# Patient Record
Sex: FEMALE | Race: WHITE | Employment: FULL TIME | URBAN - METROPOLITAN AREA
[De-identification: names, ages, dates, MRNs, and addresses within clinical notes are randomized per-mention and may not be internally consistent; named-entity substitution may affect disease eponyms.]

---

## 2017-05-25 ENCOUNTER — OFFICE VISIT (OUTPATIENT)
Dept: FAMILY MEDICINE | Facility: CLINIC | Age: 22
End: 2017-05-25
Payer: COMMERCIAL

## 2017-05-25 VITALS
OXYGEN SATURATION: 99 % | HEART RATE: 70 BPM | BODY MASS INDEX: 28.66 KG/M2 | DIASTOLIC BLOOD PRESSURE: 70 MMHG | SYSTOLIC BLOOD PRESSURE: 112 MMHG | WEIGHT: 146 LBS | TEMPERATURE: 98.9 F | HEIGHT: 60 IN

## 2017-05-25 DIAGNOSIS — N30.00 ACUTE CYSTITIS WITHOUT HEMATURIA: Primary | ICD-10-CM

## 2017-05-25 DIAGNOSIS — R35.0 URINARY FREQUENCY: ICD-10-CM

## 2017-05-25 LAB
ALBUMIN UR-MCNC: ABNORMAL MG/DL
APPEARANCE UR: CLEAR
BILIRUB UR QL STRIP: NEGATIVE
COLOR UR AUTO: YELLOW
GLUCOSE UR STRIP-MCNC: NEGATIVE MG/DL
HGB UR QL STRIP: NEGATIVE
KETONES UR STRIP-MCNC: NEGATIVE MG/DL
LEUKOCYTE ESTERASE UR QL STRIP: NEGATIVE
MUCOUS THREADS #/AREA URNS LPF: PRESENT /LPF
NITRATE UR QL: NEGATIVE
NON-SQ EPI CELLS #/AREA URNS LPF: ABNORMAL /LPF
PH UR STRIP: 7 PH (ref 5–7)
RBC #/AREA URNS AUTO: ABNORMAL /HPF (ref 0–2)
SP GR UR STRIP: 1.02 (ref 1–1.03)
URN SPEC COLLECT METH UR: ABNORMAL
UROBILINOGEN UR STRIP-ACNC: 1 EU/DL (ref 0.2–1)
WBC #/AREA URNS AUTO: ABNORMAL /HPF (ref 0–2)

## 2017-05-25 PROCEDURE — 81001 URINALYSIS AUTO W/SCOPE: CPT | Performed by: FAMILY MEDICINE

## 2017-05-25 PROCEDURE — 87086 URINE CULTURE/COLONY COUNT: CPT | Performed by: FAMILY MEDICINE

## 2017-05-25 PROCEDURE — 99213 OFFICE O/P EST LOW 20 MIN: CPT | Performed by: FAMILY MEDICINE

## 2017-05-25 RX ORDER — CIPROFLOXACIN 500 MG/1
500 TABLET, FILM COATED ORAL 2 TIMES DAILY
Qty: 10 TABLET | Refills: 0 | Status: SHIPPED | OUTPATIENT
Start: 2017-05-25 | End: 2018-04-11

## 2017-05-25 NOTE — MR AVS SNAPSHOT
"              After Visit Summary   2017    Tona Wilson    MRN: [id number]           Patient Information     Date Of Birth          1995        Visit Information        Provider Department      2017 5:40 PM Sukhdev Jameson MD [facility] [place]        Today's Diagnoses     Acute cystitis without hematuria    -  1    Urinary frequency           Follow-ups after your visit        Who to contact     If you have questions or need follow up information about today's clinic visit or your schedule please contact [facility] directly at [phone].  Normal or non-critical lab and imaging results will be communicated to you by [employer], letter or phone within 4 business days after the clinic has received the results. If you do not hear from us within 7 days, please contact the clinic through [employer] or phone. If you have a critical or abnormal lab result, we will notify you by phone as soon as possible.  Submit refill requests through [employer] or call your pharmacy and they will forward the refill request to us. Please allow 3 business days for your refill to be completed.          Additional Information About Your Visit        MyChart Information     [employer] lets you send messages to your doctor, view your test results, renew your prescriptions, schedule appointments and more. To sign up, go to [email or web address] . Click on \"Log in\" on the left side of the screen, which will take you to the Welcome page. Then click on \"Sign up Now\" on the right side of the page.     You will be asked to enter the access code listed below, as well as some personal information. Please follow the directions to create your username and password.     Your access code is: [email or web address]  Expires: 2017  5:53 PM     Your access code will  in 90 days. If you need help or a new code, please call your [facility] or [phone].        Care EveryWhere ID     This is your Care EveryWhere ID. " This could be used by other organizations to access your [place] medical records  [id number]        Your Vitals Were     Pulse Temperature Height Pulse Oximetry BMI (Body Mass Index)       70 98.9  F (37.2  C) (Tympanic) 5' (1.524 m) 99% 28.51 kg/m2        Blood Pressure from Last 3 Encounters:   05/25/17 112/70   04/26/16 110/78    Weight from Last 3 Encounters:   05/25/17 146 lb (66.2 kg)   04/26/16 138 lb 3.2 oz (62.7 kg) (68 %)*     * Growth percentiles are based on [facility][note 3] 2-20 Years data.              We Performed the Following     *UA reflex to Microscopic and Culture ([place] and [facility] (except [place] and [place])     Urine Culture Aerobic Bacterial     Urine Microscopic          Today's Medication Changes          These changes are accurate as of: 5/25/17  5:53 PM.  If you have any questions, ask your nurse or doctor.               Start taking these medicines.        Dose/Directions    ciprofloxacin 500 MG tablet   Commonly known as:  CIPRO   Used for:  Acute cystitis without hematuria   Started by:  Sukhdev Jameson MD        Dose:  500 mg   Take 1 tablet (500 mg) by mouth 2 times daily   Quantity:  10 tablet   Refills:  0            Where to get your medicines      These medications were sent to [id number]  [address]  [address] [facility]  [address] [place] [address]     Phone:  [phone]     ciprofloxacin 500 MG tablet                Primary Care Provider    None Specified       No primary provider on file.        Thank you!     Thank you for choosing [facility]  for your care. Our goal is always to provide you with excellent care. Hearing back from our patients is one way we can continue to improve our services. Please take a few minutes to complete the written survey that you may receive in the mail after your visit with us. Thank you!             Your Updated Medication List - Protect others around you: Learn how to  safely use, store and throw away your medicines at www.disposemymeds.org.          This list is accurate as of: 5/25/17  5:53 PM.  Always use your most recent med list.                   Brand Name Dispense Instructions for use    CALCIUM GUMMIES PO          ciprofloxacin 500 MG tablet    CIPRO    10 tablet    Take 1 tablet (500 mg) by mouth 2 times daily

## 2017-05-25 NOTE — NURSING NOTE
Chief Complaint   Patient presents with     UTI       Initial /70 (BP Location: Right arm, Patient Position: Chair, Cuff Size: Adult Regular)  Pulse 70  Temp 98.9  F (37.2  C) (Tympanic)  Ht 5' (1.524 m)  Wt 146 lb (66.2 kg)  SpO2 99%  BMI 28.51 kg/m2 Estimated body mass index is 28.51 kg/(m^2) as calculated from the following:    Height as of this encounter: 5' (1.524 m).    Weight as of this encounter: 146 lb (66.2 kg).  Medication Reconciliation: complete     Diana Lopez CMA

## 2017-05-25 NOTE — PROGRESS NOTES
SUBJECTIVE:                                                    Tona Wilson is a 21 year old female who presents to clinic today for the following health issues:      URINARY TRACT SYMPTOMS     Onset: x 3 days    Description:   Painful urination (Dysuria): no   Blood in urine (Hematuria): no   Delay in urine (Hesitency): no     Intensity: mild    Progression of Symptoms:  worsening    Accompanying Signs & Symptoms:  Fever/chills: no   Flank pain no   Nausea and vomiting: YES  Any vaginal symptoms: none  Abdominal/Pelvic Pain: YES   History:   History of frequent UTI's: no   History of kidney stones: no   Sexually Active: YES  Possibility of pregnancy: No    Precipitating factors:            Therapies Tried and outcome: Pyridium     Problem list and histories reviewed & adjusted, as indicated.  Additional history: as documented    Patient Active Problem List   Diagnosis     Breast lump in female     Easy bruising     Past Surgical History:   Procedure Laterality Date     dental surgrey         Social History   Substance Use Topics     Smoking status: Never Smoker     Smokeless tobacco: Not on file     Alcohol use No     Family History   Problem Relation Age of Onset     DIABETES No family hx of      Coronary Artery Disease No family hx of      Breast Cancer No family hx of          Current Outpatient Prescriptions   Medication Sig Dispense Refill     Calcium-Phosphorus-Vitamin D (CALCIUM GUMMIES PO)        ciprofloxacin (CIPRO) 500 MG tablet Take 1 tablet (500 mg) by mouth 2 times daily 10 tablet 0     No Known Allergies  No lab results found.   BP Readings from Last 3 Encounters:   05/25/17 112/70   04/26/16 110/78    Wt Readings from Last 3 Encounters:   05/25/17 146 lb (66.2 kg)   04/26/16 138 lb 3.2 oz (62.7 kg) (68 %)*     * Growth percentiles are based on CDC 2-20 Years data.                    Reviewed and updated as needed this visit by clinical staff       Reviewed and updated as needed this visit by  Provider         ROS:  Constitutional, HEENT, cardiovascular, pulmonary, gi and gu systems are negative, except as otherwise noted.    OBJECTIVE:                                                    /70 (BP Location: Right arm, Patient Position: Chair, Cuff Size: Adult Regular)  Pulse 70  Temp 98.9  F (37.2  C) (Tympanic)  Ht 5' (1.524 m)  Wt 146 lb (66.2 kg)  SpO2 99%  BMI 28.51 kg/m2  Body mass index is 28.51 kg/(m^2).  GENERAL: healthy, alert and no distress  NECK: no adenopathy, no asymmetry, masses, or scars and thyroid normal to palpation  RESP: lungs clear to auscultation - no rales, rhonchi or wheezes  CV: regular rate and rhythm, normal S1 S2, no S3 or S4, no murmur, click or rub, no peripheral edema and peripheral pulses strong  ABDOMEN: soft, nontender, no hepatosplenomegaly, no masses and bowel sounds normal  MS: no gross musculoskeletal defects noted, no edema         ASSESSMENT/PLAN:                                                    Tona was seen today for uti.    Diagnoses and all orders for this visit:    Acute cystitis without hematuria  -     ciprofloxacin (CIPRO) 500 MG tablet; Take 1 tablet (500 mg) by mouth 2 times daily  -     Urine Culture Aerobic Bacterial    Urinary frequency  -     *UA reflex to Microscopic and Culture ([place] and [facility] (except [place] and [place])  -     Urine Microscopic        Sukhdev Jameson MD  [facility]

## 2017-05-26 LAB
BACTERIA SPEC CULT: NO GROWTH
MICRO REPORT STATUS: NORMAL
SPECIMEN SOURCE: NORMAL

## 2017-08-10 ENCOUNTER — TELEPHONE (OUTPATIENT)
Dept: FAMILY MEDICINE | Facility: CLINIC | Age: 22
End: 2017-08-10

## 2017-08-10 NOTE — TELEPHONE ENCOUNTER
"Patient calling and states she has a bad migraine.  She states she became nauseated and when she stood up to go to the bathroom she was very unsteady and \"felt like she was going to fall over\".  Reports feeling like the room is spinning and having \"kind of blurred vision that is hazy and comes back into focus\"  She reports having weakness.      Denies: numbness or tingling on one side of the body, stiff neck, fever, changes in speech, confusion    Advised to seek ER care for some concerning symptoms.  Patient agreed with plan.  Laura Everett RN - Triage  [facility]    "

## 2018-04-11 ENCOUNTER — HOSPITAL ENCOUNTER (EMERGENCY)
Facility: CLINIC | Age: 23
Discharge: HOME | End: 2018-04-11
Attending: EMERGENCY MEDICINE | Admitting: EMERGENCY MEDICINE
Payer: COMMERCIAL

## 2018-04-11 ENCOUNTER — OFFICE VISIT (OUTPATIENT)
Dept: FAMILY MEDICINE | Facility: CLINIC | Age: 23
End: 2018-04-11
Payer: COMMERCIAL

## 2018-04-11 ENCOUNTER — APPOINTMENT (OUTPATIENT)
Dept: GENERAL RADIOLOGY | Facility: CLINIC | Age: 23
End: 2018-04-11
Attending: EMERGENCY MEDICINE
Payer: COMMERCIAL

## 2018-04-11 ENCOUNTER — RADIANT APPOINTMENT (OUTPATIENT)
Dept: GENERAL RADIOLOGY | Facility: CLINIC | Age: 23
End: 2018-04-11
Attending: FAMILY MEDICINE
Payer: COMMERCIAL

## 2018-04-11 VITALS
SYSTOLIC BLOOD PRESSURE: 104 MMHG | WEIGHT: 136 LBS | TEMPERATURE: 97.6 F | BODY MASS INDEX: 26.56 KG/M2 | DIASTOLIC BLOOD PRESSURE: 70 MMHG | HEART RATE: 80 BPM

## 2018-04-11 VITALS
RESPIRATION RATE: 12 BRPM | BODY MASS INDEX: 26.7 KG/M2 | SYSTOLIC BLOOD PRESSURE: 125 MMHG | TEMPERATURE: 98.6 F | WEIGHT: 136 LBS | HEIGHT: 60 IN | OXYGEN SATURATION: 100 % | HEART RATE: 96 BPM | DIASTOLIC BLOOD PRESSURE: 79 MMHG

## 2018-04-11 DIAGNOSIS — S69.90XA THUMB INJURY, INITIAL ENCOUNTER: ICD-10-CM

## 2018-04-11 DIAGNOSIS — S62.523B OPEN FRACTURE OF TUFT OF DISTAL PHALANX OF THUMB: ICD-10-CM

## 2018-04-11 DIAGNOSIS — S60.10XA SUBUNGUAL HEMATOMA OF FINGER, INITIAL ENCOUNTER: ICD-10-CM

## 2018-04-11 DIAGNOSIS — S69.90XA THUMB INJURY, INITIAL ENCOUNTER: Primary | ICD-10-CM

## 2018-04-11 PROCEDURE — 99284 EMERGENCY DEPT VISIT MOD MDM: CPT | Mod: 25

## 2018-04-11 PROCEDURE — 99283 EMERGENCY DEPT VISIT LOW MDM: CPT | Mod: 25

## 2018-04-11 PROCEDURE — 11740 EVACUATION SUBUNGUAL HMTMA: CPT

## 2018-04-11 PROCEDURE — 26750 TREAT FINGER FRACTURE EACH: CPT | Mod: LT

## 2018-04-11 PROCEDURE — 73140 X-RAY EXAM OF FINGER(S): CPT | Mod: LT

## 2018-04-11 PROCEDURE — 99214 OFFICE O/P EST MOD 30 MIN: CPT | Performed by: FAMILY MEDICINE

## 2018-04-11 RX ORDER — TRAMADOL HYDROCHLORIDE 50 MG/1
50 TABLET ORAL EVERY 6 HOURS PRN
Qty: 10 TABLET | Refills: 0 | Status: SHIPPED | OUTPATIENT
Start: 2018-04-11

## 2018-04-11 ASSESSMENT — ENCOUNTER SYMPTOMS: ARTHRALGIAS: 1

## 2018-04-11 NOTE — PROGRESS NOTES
SUBJECTIVE:   Tona Wilson is a 22 year old female who presents to clinic today for the following health issues:      Concern - left thumb discomfort  Onset: last evening    Description:   Slammed thumb in car door    Intensity: moderate    Progression of Symptoms:  worsening    Accompanying Signs & Symptoms:  Discoloration and swelling     Therapies Tried and outcome: ICE    Patient came today after she has accidentally bumped left thumb area in the car door. she is having discomfort and swelling and discoloration of the nail since then.  She feels a pulsating pain.  Normal range of motion.    Reviewed and updated as needed this visit by clinical staff  Tobacco  Allergies  Meds  Soc Hx      Reviewed and updated as needed this visit by Provider         ROS:  CONSTITUTIONAL: NEGATIVE for fever, chills, change in weight  ROS otherwise negative    OBJECTIVE:                                                    /70  Pulse 80  Temp 97.6  F (36.4  C) (Tympanic)  Wt 136 lb (61.7 kg)  LMP 03/27/2018 (Exact Date)  BMI 26.56 kg/m2  Body mass index is 26.56 kg/(m^2).   GENERAL: healthy, alert, well nourished, well hydrated, no distress  Thumb has sub uncal hematoma.         ASSESSMENT/PLAN:                                                        ICD-10-CM    1. Thumb injury, initial encounter S69.90XA XR Finger Left G/E 2 Views     traMADol (ULTRAM) 50 MG tablet     ORTHOPEDICS ADULT REFERRAL   2. Subungual hematoma of finger, initial encounter S60.10XA        Patient has thumb injury to the distal phalanx.  X-ray reviewed possible small nondisplaced fracture to that area.  Patient also have subungual hematoma.  We discussed about treatment including small hole to drain it.  Trauma happened almost 12-24 hours ago.  I suggested soaking it in the water I gave her pain medication.  If her pain is not well controlled she is advised to go to the urgent care or ER setting to have them look at it and possible drainage of  that.  I also given a referral for orthopedics for further evaluation of distal phalanx possible fracture.    Benny Rodríguez MD  [facility]

## 2018-04-11 NOTE — NURSING NOTE
Chief Complaint   Patient presents with     Thumb Discomfort     left        Initial /70  Pulse 80  Temp 97.6  F (36.4  C) (Tympanic)  Wt 136 lb (61.7 kg)  LMP 03/27/2018 (Exact Date)  BMI 26.56 kg/m2 Estimated body mass index is 26.56 kg/(m^2) as calculated from the following:    Height as of 5/25/17: 5' (1.524 m).    Weight as of this encounter: 136 lb (61.7 kg).  Medication Reconciliation: complete    Current Outpatient Prescriptions   Medication Sig Dispense Refill     Calcium-Phosphorus-Vitamin D (CALCIUM GUMMIES PO)        ciprofloxacin (CIPRO) 500 MG tablet Take 1 tablet (500 mg) by mouth 2 times daily (Patient not taking: Reported on 4/11/2018) 10 tablet 0       Saul M, [facility]

## 2018-04-11 NOTE — DISCHARGE INSTRUCTIONS
Finger Fracture, Closed  You have a broken finger (fracture). This causes local pain, swelling, and bruising. This injury usually takes about 4 to 6 weeks to heal, but can take longer in some cases. Finger injuries are often treated with a splint or cast, or by taping the injured finger to the next one (sanket taping). This protects the injured finger and holds the bone in position while it heals. More serious fractures may need surgery.     If the fingernail has been severely injured, it will probably fall off in 1 to 2 weeks. A new fingernail will usually start to grow back within a month.  Home care  Follow these guidelines when caring for yourself at home:    Keep your hand elevated to reduce pain and swelling. When sitting or lying down keep your arm above the level of your heart. You can do this by placing your arm on a pillow that rests on your chest or on a pillow at your side. This is most important during the first 2 days (48 hours) after the injury.    Put an ice pack on the injured area. Do this for 20 minutes every 1 to 2 hours the first day for pain relief. You can make an ice pack by wrapping a plastic bag of ice cubes in a thin towel. As the ice melts, be careful that the cast or splint doesn t get wet. Continue using the ice pack 3 to 4 times a day until the pain and swelling go away.    Keep the cast or splint completely dry at all times. Bathe with your cast or splint out of the water. Protect it with a large plastic bag, rubber-banded at the top end. If a fiberglass cast or splint gets wet, you can dry it with a hair dryer.    If sanket tape was put on and it becomes wet or dirty, change it. You may replace it with paper, plastic, or cloth tape. Cloth tape and paper tapes must be kept dry. Keep the sanket tape in place for at least 4 weeks.    You may use acetaminophen or ibuprofen to control pain, unless another pain medicine was prescribed. If you have chronic liver or kidney disease, talk with  your healthcare provider before using these medicines. Also talk with your provider if you ve had a stomach ulcer or gastrointestinal bleeding.    Don t put creams or objects under the cast if you have itching.  Follow-up care  Follow up with your healthcare provider, or as advised. This is to make sure the bone is healing the way it should.  X-rays may be taken. You will be told of any new findings that may affect your care.  When to seek medical advice  Call your healthcare provider right away if any of these occur:    The plaster cast or splint becomes wet or soft    The cast or splint cracks    The fiberglass cast or splint stays wet for more than 24 hours    Pain or swelling gets worse    Redness, warmth, swelling, drainage from the wound, or foul odor from a cast or splint    Finger becomes more cold, blue, numb, or tingly    You can t move your finger    The skin around the cast or splint becomes red    Fever of 100.4 F (38 C) or higher, or as directed by your healthcare provider  Date Last Reviewed: 2/1/2017 2000-2017 [employer]. [address] [place] [address]. All rights reserved. This information is not intended as a substitute for professional medical care. Always follow your healthcare professional's instructions.        Subungual Hematoma    You just slammed the car door on your finger. The pain is nearly unbearable, and your nail has turned black and blue. It's likely you have a subungual hematoma. This is a pool of blood that collects under a nail after an injury. Although a nail hematoma is seldom serious, it can be very painful.  When to go to the emergency room (ER)  Any severe blow to a finger or toe should be checked by your health care provider. You may have broken bones or damage to other tissues. If you can't see your health care provider right away, go to the nearest emergency department.  What to expect in the ER    Your nail will be examined.    X-rays may be  taken to check for a bone fracture or other injury.    To drain the blood from the hematoma and relieve pain, the health care provider may make a small hole in the nail using a special mauricio or drill. The blood under the nail can drain out through this hole. The nail is then bandaged.    If the nail is badly damaged it may need to be removed. Deep cuts beneath the nail can then be repaired with stitches.  Follow-up  If the damaged nail isn't removed, it will most likely fall off on its own. A fingernail can regrow in as little as 8 weeks. Toenails take longer -- about 6 months. See your health care provider if you have any problems with the nail as it heals and regrows.  Date Last Reviewed: 5/5/2015 2000-2017 [employer]. [address] [place] [address], [place] [address]. All rights reserved. This information is not intended as a substitute for professional medical care. Always follow your healthcare professional's instructions.

## 2018-04-11 NOTE — MR AVS SNAPSHOT
After Visit Summary   4/11/2018    Tona Wilson    MRN: [id number]           Patient Information     Date Of Birth          1995        Visit Information        Provider Department      4/11/2018 1:00 PM Benny Rodríguez MD [facility] [place]        Today's Diagnoses     Thumb injury, initial encounter    -  1    Subungual hematoma of finger, initial encounter           Follow-ups after your visit        Additional Services     ORTHOPEDICS ADULT REFERRAL       Your provider has referred you to: [facility] [place] [phone]   [email or web address]    Please be aware that coverage of these services is subject to the terms and limitations of your health insurance plan.  Call member services at your health plan with any benefit or coverage questions.      Please bring the following to your appointment:    >>   Any x-rays, CTs or MRIs which have been performed.  Contact the facility where they were done to arrange for  prior to your scheduled appointment.  Any new CT, MRI or other procedures ordered by your specialist must be performed at a [place] facility or coordinated by your clinic's referral office.    >>   List of current medications   >>   This referral request   >>   Any documents/labs given to you for this referral                  Who to contact     If you have questions or need follow up information about today's clinic visit or your schedule please contact [facility] [place] directly at [phone].  Normal or non-critical lab and imaging results will be communicated to you by MyChart, letter or phone within 4 business days after the clinic has received the results. If you do not hear from us within 7 days, please contact the clinic through MyChart or phone. If you have a critical or abnormal lab result, we will notify you by phone as soon as possible.  Submit refill requests through [employer] or call your pharmacy and they will  "forward the refill request to us. Please allow 3 business days for your refill to be completed.          Additional Information About Your Visit        [employer] Information     [employer] lets you send messages to your doctor, view your test results, renew your prescriptions, schedule appointments and more. To sign up, go to [email or web address] . Click on \"Log in\" on the left side of the screen, which will take you to the Welcome page. Then click on \"Sign up Now\" on the right side of the page.     You will be asked to enter the access code listed below, as well as some personal information. Please follow the directions to create your username and password.     Your access code is: [email or web address]  Expires: 7/10/2018  1:36 PM     Your access code will  in 90 days. If you need help or a new code, please call your [place] clinic or [phone].        Care EveryWhere ID     This is your Care EveryWhere ID. This could be used by other organizations to access your [place] medical records  [id number]        Your Vitals Were     Pulse Temperature Last Period BMI (Body Mass Index)          80 97.6  F (36.4  C) (Tympanic) 2018 (Exact Date) 26.56 kg/m2         Blood Pressure from Last 3 Encounters:   18 104/70   17 112/70   16 110/78    Weight from Last 3 Encounters:   18 136 lb (61.7 kg)   17 146 lb (66.2 kg)   16 138 lb 3.2 oz (62.7 kg) (68 %)*     * Growth percentiles are based on CDC 2-20 Years data.              We Performed the Following     ORTHOPEDICS ADULT REFERRAL          Today's Medication Changes          These changes are accurate as of 18  1:36 PM.  If you have any questions, ask your nurse or doctor.               Start taking these medicines.        Dose/Directions    traMADol 50 MG tablet   Commonly known as:  ULTRAM   Used for:  Thumb injury, initial encounter   Started by:  Benny Rodríguez MD        Dose:  50 mg   Take 1 tablet (50 mg) by mouth every 6 " hours as needed for severe pain   Quantity:  10 tablet   Refills:  0         Stop taking these medicines if you haven't already. Please contact your care team if you have questions.     ciprofloxacin 500 MG tablet   Commonly known as:  CIPRO   Stopped by:  Benny Rodríguez MD                Where to get your medicines      Some of these will need a paper prescription and others can be bought over the counter.  Ask your nurse if you have questions.     Bring a paper prescription for each of these medications     traMADol 50 MG tablet                Primary Care Provider    [facility] MD Melanie       No address on file        Equal Access to Services     [facility]: Hadii aad ku hadasho Soomaali, waaxda luqadaha, qaybta kaalmada adeegyaant, leno buckner . So [facility] [phone].    ATENCIÓN: Si habla español, tiene a laguerre disposición servicios gratuitos de asistencia lingüística. Llame al [phone].    We comply with applicable federal civil rights laws and [place] laws. We do not discriminate on the basis of race, color, national origin, age, disability, sex, sexual orientation, or gender identity.            Thank you!     [facility]. Our goal is always to provide you with excellent care. Hearing back from our patients is one way we can continue to improve our services. Please take a few minutes to complete the written survey that you may receive in the mail after your visit with us. Thank you!             Your Updated Medication List - Protect others around you: Learn how to safely use, store and throw away your medicines at www.disposemymeds.org.          This list is accurate as of 4/11/18  1:36 PM.  Always use your most recent med list.                   Brand Name Dispense Instructions for use Diagnosis    CALCIUM GUMMIES PO           traMADol 50 MG tablet    ULTRAM    10 tablet    Take 1 tablet (50 mg) by mouth every 6  hours as needed for severe pain    Thumb injury, initial encounter

## 2018-04-11 NOTE — ED AVS SNAPSHOT
Emergency Department    [address] [place] [address]    Phone:  [phone]    Fax:  [phone]                                       Tona Wilson   MRN: [id number]    Department:   Emergency Department   Date of Visit:  4/11/2018           After Visit Summary Signature Page     I have received my discharge instructions, and my questions have been answered. I have discussed any challenges I see with this plan with the nurse or doctor.    ..........................................................................................................................................  Patient/Patient Representative Signature      ..........................................................................................................................................  Patient Representative Print Name and Relationship to Patient    ..................................................               ................................................  Date                                            Time    ..........................................................................................................................................  Reviewed by Signature/Title    ...................................................              ..............................................  Date                                                            Time

## 2018-04-11 NOTE — ED PROVIDER NOTES
"  History     Chief Complaint:  Hand Pain    HPI   Tona Wilson is an otherwise healthy 22 year old female who presents to the ED for evaluation of hand pain. The patient reports that she slammed her left thumb in her car door earlier today. She presented to her clinic, but was referred here as they \"did not have the proper tools there.\" Her last tetanus was in 2016. She denies any other injuries or complaints here today.    Allergies:  NKDA    Medications:    Tramadol    Past Medical History:    The patient denies any significant past medical history.    Past Surgical History:    Dental Surgery    Family History:    No past pertinent family history.    Social History:  Marital Status:  Single [1]  Negative for tobacco use.  Negative for alcohol use.    Review of Systems   Musculoskeletal: Positive for arthralgias.   All other systems reviewed and are negative.    Physical Exam     Patient Vitals for the past 24 hrs:   BP Temp Temp src Pulse Heart Rate Resp SpO2 Height Weight   04/11/18 1401 125/79 98.6  F (37  C) Oral 96 96 12 100 % 1.524 m (5') 61.7 kg (136 lb)     Physical Exam  General: Appears well-developed and well-nourished.   Head: No signs of trauma.   CV: Normal rate and regular rhythm.    Resp: Effort normal. No respiratory distress.   MSK: +tenderness to distal aspect of left thumb with associated swelling and ecchymosis under the nail.  No nailbed injury, no lacerations.  Neuro: The patient is alert and oriented.  Strength in upper extremities normal and symmetrical.   Sensation normal. Speech normal.  GCS 15  Skin: Skin is warm and dry. No rash noted.   Psych: normal mood and affect. behavior is normal.       Emergency Department Course     Imaging:  Radiographic findings were communicated with the patient who voiced understanding of the findings.  XR Fingers 2-3 views, left:   Mildly displaced fracture of the distal tuft of the first distal phalanx, as per radiology.     Procedures:  Procedure: Nail " Trephination    Location: Left Thumb    Indication: Subungual hematoma    Consent: verbal consent as obtained by the patient prior to the procedure.     Technique: using an 18g needle I placed a hole in the mid-proximal nail bed. There was a release of blood. There was no complications. The patient immediatly felt better following this intervention.     Emergency Department Course:  Nursing notes and vitals reviewed. (1410) I performed an exam of the patient as documented above.     The patient was sent for a Fingers XR while in the emergency department, findings above.     (1511) I rechecked the patient and discussed the results of her workup thus far. I performed a nail trephination, as noted above.    Findings and plan explained to the Patient. Patient discharged home with instructions regarding supportive care, medications, and reasons to return. The importance of close follow-up was reviewed.    I personally reviewed the imaging results with the Patient and answered all related questions prior to discharge.    Impression & Plan      Medical Decision Making:  Tona Wilson is a 22 year old female who presents due to thumb pain.  She actually closed her thumb in a car door shortly prior to arrival.  On my evaluation, she did have some swelling over the left distal thumb along with a subungual hematoma.  X-ray obtained that did show a tuft fracture but no other injuries.  I did do a trephination of the nail as needed which the patient tolerated quite well.  She was recommended to use warm compresses to that area along with ice to the pad of the finger.  She was given a splint and I recommended follow-up with orthopedic hand specialty for further evaluation to ensure that she is healing well.    Diagnosis:    ICD-10-CM   1. Open fracture of tuft of distal phalanx of thumb S62.523B   2. Subungual hematoma of finger, initial encounter S60.10XA     Disposition:  discharged to home    Scribe Disclosure:  Amy GAGNON  Jose, am serving as a scribe on 4/11/2018 at 2:19 PM to personally document services performed by Sheng Rae MD based on my observations and the provider's statements to me.     Amy Garcia  4/11/2018    EMERGENCY DEPARTMENT       Sheng Rae MD  04/13/18 174

## 2018-04-11 NOTE — ED AVS SNAPSHOT
Emergency Department    [address] [facility] [address]    Phone:  [phone]    Fax:  [phone]                                       Tona Wilson   MRN: [id number]    Department:   Emergency Department   Date of Visit:  4/11/2018           Patient Information     Date Of Birth          1995        Your diagnoses for this visit were:     Open fracture of tuft of distal phalanx of thumb     Subungual hematoma of finger, initial encounter        You were seen by Sheng Rae MD.      Follow-up Information     Call Bobby Cardoza MD.    Specialty:  Orthopedics    Contact information:    [facility]  [address] [address]  [phone]          Follow up with  Emergency Department.    Specialty:  EMERGENCY MEDICINE    Why:  As needed, If symptoms worsen    Contact information:    [address] [facility] [id number] [phone]        Discharge Instructions         Finger Fracture, Closed  You have a broken finger (fracture). This causes local pain, swelling, and bruising. This injury usually takes about 4 to 6 weeks to heal, but can take longer in some cases. Finger injuries are often treated with a splint or cast, or by taping the injured finger to the next one (buddy taping). This protects the injured finger and holds the bone in position while it heals. More serious fractures may need surgery.     If the fingernail has been severely injured, it will probably fall off in 1 to 2 weeks. A new fingernail will usually start to grow back within a month.  Home care  Follow these guidelines when caring for yourself at home:    Keep your hand elevated to reduce pain and swelling. When sitting or lying down keep your arm above the level of your heart. You can do this by placing your arm on a pillow that rests on your chest or on a pillow at your side. This is most important during the first 2 days (48 hours) after the injury.    Put an ice  pack on the injured area. Do this for 20 minutes every 1 to 2 hours the first day for pain relief. You can make an ice pack by wrapping a plastic bag of ice cubes in a thin towel. As the ice melts, be careful that the cast or splint doesn t get wet. Continue using the ice pack 3 to 4 times a day until the pain and swelling go away.    Keep the cast or splint completely dry at all times. Bathe with your cast or splint out of the water. Protect it with a large plastic bag, rubber-banded at the top end. If a fiberglass cast or splint gets wet, you can dry it with a hair dryer.    If sanket tape was put on and it becomes wet or dirty, change it. You may replace it with paper, plastic, or cloth tape. Cloth tape and paper tapes must be kept dry. Keep the sanket tape in place for at least 4 weeks.    You may use acetaminophen or ibuprofen to control pain, unless another pain medicine was prescribed. If you have chronic liver or kidney disease, talk with your healthcare provider before using these medicines. Also talk with your provider if you ve had a stomach ulcer or gastrointestinal bleeding.    Don t put creams or objects under the cast if you have itching.  Follow-up care  Follow up with your healthcare provider, or as advised. This is to make sure the bone is healing the way it should.  X-rays may be taken. You will be told of any new findings that may affect your care.  When to seek medical advice  Call your healthcare provider right away if any of these occur:    The plaster cast or splint becomes wet or soft    The cast or splint cracks    The fiberglass cast or splint stays wet for more than 24 hours    Pain or swelling gets worse    Redness, warmth, swelling, drainage from the wound, or foul odor from a cast or splint    Finger becomes more cold, blue, numb, or tingly    You can t move your finger    The skin around the cast or splint becomes red    Fever of 100.4 F (38 C) or higher, or as directed by your  healthcare provider  Date Last Reviewed: 2/1/2017 2000-2017 [employer]. [address] [place] [address]. All rights reserved. This information is not intended as a substitute for professional medical care. Always follow your healthcare professional's instructions.        Subungual Hematoma    You just slammed the car door on your finger. The pain is nearly unbearable, and your nail has turned black and blue. It's likely you have a subungual hematoma. This is a pool of blood that collects under a nail after an injury. Although a nail hematoma is seldom serious, it can be very painful.  When to go to the emergency room (ER)  Any severe blow to a finger or toe should be checked by your health care provider. You may have broken bones or damage to other tissues. If you can't see your health care provider right away, go to the nearest emergency department.  What to expect in the ER    Your nail will be examined.    X-rays may be taken to check for a bone fracture or other injury.    To drain the blood from the hematoma and relieve pain, the health care provider may make a small hole in the nail using a special mauricio or drill. The blood under the nail can drain out through this hole. The nail is then bandaged.    If the nail is badly damaged it may need to be removed. Deep cuts beneath the nail can then be repaired with stitches.  Follow-up  If the damaged nail isn't removed, it will most likely fall off on its own. A fingernail can regrow in as little as 8 weeks. Toenails take longer -- about 6 months. See your health care provider if you have any problems with the nail as it heals and regrows.  Date Last Reviewed: 5/5/2015 2000-2017 [employer]. [address] [place] [address]. All rights reserved. This information is not intended as a substitute for professional medical care. Always follow your healthcare professional's instructions.          24 Hour Appointment  Hotline       To make an appointment at any [facility], call [phone] ([phone]). If you don't have a family doctor or clinic, we will help you find one. [facility] are conveniently located to serve the needs of you and your family.             Review of your medicines      Our records show that you are taking the medicines listed below. If these are incorrect, please call your family doctor or clinic.        Dose / Directions Last dose taken    CALCIUM GUMMIES PO        Refills:  0        traMADol 50 MG tablet   Commonly known as:  ULTRAM   Dose:  50 mg   Quantity:  10 tablet        Take 1 tablet (50 mg) by mouth every 6 hours as needed for severe pain   Refills:  0                Procedures and tests performed during your visit     Fingers XR, 2-3 views, left      Orders Needing Specimen Collection     None      Pending Results     Date and Time Order Name Status Description    4/11/2018 1322 XR FINGER LT G/E 2 VW In process             Pending Culture Results     No orders found from 4/9/2018 to 4/12/2018.            Pending Results Instructions     If you had any lab results that were not finalized at the time of your Discharge, you can call the ED Lab Result RN at [phone]. You will be contacted by this team for any positive Lab results or changes in treatment. The nurses are available 7 days a week from 10A to 6:30P.  You can leave a message 24 hours per day and they will return your call.        Test Results From Your Hospital Stay        4/11/2018  2:48 PM      Narrative     XR FINGER LT G/E 2 VW 4/11/2018 2:35 PM    HISTORY: Pain.    COMPARISON: None.        Impression     IMPRESSION: Mildly displaced fracture of the distal tuft of the first  distal phalanx.    MACHO RITTER MD                Clinical Quality Measure: Blood Pressure Screening     Your blood pressure was checked while you were in the emergency department today. The last reading we obtained was  BP: 125/79 . Please  "read the guidelines below about what these numbers mean and what you should do about them.  If your systolic blood pressure (the top number) is less than 120 and your diastolic blood pressure (the bottom number) is less than 80, then your blood pressure is normal. There is nothing more that you need to do about it.  If your systolic blood pressure (the top number) is 120-139 or your diastolic blood pressure (the bottom number) is 80-89, your blood pressure may be higher than it should be. You should have your blood pressure rechecked within a year by a primary care provider.  If your systolic blood pressure (the top number) is 140 or greater or your diastolic blood pressure (the bottom number) is 90 or greater, you may have high blood pressure. High blood pressure is treatable, but if left untreated over time it can put you at risk for heart attack, stroke, or kidney failure. You should have your blood pressure rechecked by a primary care provider within the next 4 weeks.  If your provider in the emergency department today gave you specific instructions to follow-up with your doctor or provider even sooner than that, you should follow that instruction and not wait for up to 4 weeks for your follow-up visit.        Thank you for choosing [facility]       Thank you for choosing [facility] for your care. Our goal is always to provide you with excellent care. Hearing back from our patients is one way we can continue to improve our services. Please take a few minutes to complete the written survey that you may receive in the mail after you visit with us. Thank you!        [facility] Information     [employer] lets you send messages to your doctor, view your test results, renew your prescriptions, schedule appointments and more. To sign up, go to [email or web address] . Click on \"Log in\" on the left side of the screen, which will take you to the Welcome page. Then click on \"Sign up Now\" on the right side of the page.     You " will be asked to enter the access code listed below, as well as some personal information. Please follow the directions to create your username and password.     Your access code is: [email or web address]  Expires: 7/10/2018  1:36 PM     Your access code will  in 90 days. If you need help or a new code, please call your [facility] clinic or [phone].        Care EveryWhere ID     This is your Care EveryWhere ID. This could be used by other organizations to access your [facility] medical records  [id number]        Equal Access to Services     [facility]: Leonardo Sharp, hemant colby, ana m camacho, leno buckner . [facility] [phone].    ATENCIÓN: Si habla español, tiene a laguerre disposición servicios gratuitos de asistencia lingüística. Lelandame al [phone].    We comply with applicable federal civil rights laws and [place] laws. We do not discriminate on the basis of race, color, national origin, age, disability, sex, sexual orientation, or gender identity.            After Visit Summary       This is your record. Keep this with you and show to your community pharmacist(s) and doctor(s) at your next visit.

## 2018-04-25 ENCOUNTER — TRANSFERRED RECORDS (OUTPATIENT)
Dept: HEALTH INFORMATION MANAGEMENT | Facility: CLINIC | Age: 23
End: 2018-04-25

## 2018-06-13 ENCOUNTER — TRANSFERRED RECORDS (OUTPATIENT)
Dept: HEALTH INFORMATION MANAGEMENT | Facility: CLINIC | Age: 23
End: 2018-06-13

## 2022-06-19 ENCOUNTER — HOSPITAL ENCOUNTER (EMERGENCY)
Facility: CLINIC | Age: 27
Discharge: HOME | End: 2022-06-19
Attending: PHYSICIAN ASSISTANT | Admitting: PHYSICIAN ASSISTANT
Payer: COMMERCIAL

## 2022-06-19 VITALS
SYSTOLIC BLOOD PRESSURE: 119 MMHG | HEART RATE: 62 BPM | BODY MASS INDEX: 26.5 KG/M2 | HEIGHT: 60 IN | WEIGHT: 135 LBS | TEMPERATURE: 96.8 F | DIASTOLIC BLOOD PRESSURE: 73 MMHG | RESPIRATION RATE: 17 BRPM | OXYGEN SATURATION: 100 %

## 2022-06-19 DIAGNOSIS — R55 SYNCOPE, UNSPECIFIED SYNCOPE TYPE: ICD-10-CM

## 2022-06-19 LAB
ANION GAP SERPL CALCULATED.3IONS-SCNC: 4 MMOL/L (ref 3–14)
ATRIAL RATE - MUSE: 68 BPM
BASOPHILS # BLD AUTO: 0.1 10E3/UL (ref 0–0.2)
BASOPHILS NFR BLD AUTO: 1 %
BUN SERPL-MCNC: 9 MG/DL (ref 7–30)
CALCIUM SERPL-MCNC: 7.5 MG/DL (ref 8.5–10.1)
CHLORIDE BLD-SCNC: 113 MMOL/L (ref 94–109)
CO2 SERPL-SCNC: 23 MMOL/L (ref 20–32)
CREAT SERPL-MCNC: 0.59 MG/DL (ref 0.52–1.04)
DIASTOLIC BLOOD PRESSURE - MUSE: NORMAL MMHG
EOSINOPHIL # BLD AUTO: 0.1 10E3/UL (ref 0–0.7)
EOSINOPHIL NFR BLD AUTO: 1 %
ERYTHROCYTE [DISTWIDTH] IN BLOOD BY AUTOMATED COUNT: 14.6 % (ref 10–15)
GFR SERPL CREATININE-BSD FRML MDRD: >90 ML/MIN/1.73M2
GLUCOSE BLD-MCNC: 106 MG/DL (ref 70–99)
HCG SERPL QL: NEGATIVE
HCT VFR BLD AUTO: 36.2 % (ref 35–47)
HGB BLD-MCNC: 11.8 G/DL (ref 11.7–15.7)
HOLD SPECIMEN: NORMAL
HOLD SPECIMEN: NORMAL
IMM GRANULOCYTES # BLD: 0 10E3/UL
IMM GRANULOCYTES NFR BLD: 0 %
INTERPRETATION ECG - MUSE: NORMAL
LYMPHOCYTES # BLD AUTO: 1.1 10E3/UL (ref 0.8–5.3)
LYMPHOCYTES NFR BLD AUTO: 12 %
MCH RBC QN AUTO: 22.7 PG (ref 26.5–33)
MCHC RBC AUTO-ENTMCNC: 32.6 G/DL (ref 31.5–36.5)
MCV RBC AUTO: 70 FL (ref 78–100)
MONOCYTES # BLD AUTO: 0.4 10E3/UL (ref 0–1.3)
MONOCYTES NFR BLD AUTO: 5 %
NEUTROPHILS # BLD AUTO: 7.5 10E3/UL (ref 1.6–8.3)
NEUTROPHILS NFR BLD AUTO: 81 %
NRBC # BLD AUTO: 0 10E3/UL
NRBC BLD AUTO-RTO: 0 /100
P AXIS - MUSE: 30 DEGREES
PLATELET # BLD AUTO: 323 10E3/UL (ref 150–450)
POTASSIUM BLD-SCNC: 4 MMOL/L (ref 3.4–5.3)
PR INTERVAL - MUSE: 138 MS
QRS DURATION - MUSE: 82 MS
QT - MUSE: 404 MS
QTC - MUSE: 429 MS
R AXIS - MUSE: 69 DEGREES
RBC # BLD AUTO: 5.2 10E6/UL (ref 3.8–5.2)
SODIUM SERPL-SCNC: 140 MMOL/L (ref 133–144)
SYSTOLIC BLOOD PRESSURE - MUSE: NORMAL MMHG
T AXIS - MUSE: 45 DEGREES
VENTRICULAR RATE- MUSE: 68 BPM
WBC # BLD AUTO: 9.2 10E3/UL (ref 4–11)

## 2022-06-19 PROCEDURE — 36415 COLL VENOUS BLD VENIPUNCTURE: CPT | Performed by: EMERGENCY MEDICINE

## 2022-06-19 PROCEDURE — 80048 BASIC METABOLIC PNL TOTAL CA: CPT | Performed by: EMERGENCY MEDICINE

## 2022-06-19 PROCEDURE — 96360 HYDRATION IV INFUSION INIT: CPT

## 2022-06-19 PROCEDURE — 85018 HEMOGLOBIN: CPT | Performed by: EMERGENCY MEDICINE

## 2022-06-19 PROCEDURE — 96361 HYDRATE IV INFUSION ADD-ON: CPT

## 2022-06-19 PROCEDURE — 99284 EMERGENCY DEPT VISIT MOD MDM: CPT | Mod: 25

## 2022-06-19 PROCEDURE — 258N000003 HC RX IP 258 OP 636: Performed by: EMERGENCY MEDICINE

## 2022-06-19 PROCEDURE — 84703 CHORIONIC GONADOTROPIN ASSAY: CPT | Performed by: EMERGENCY MEDICINE

## 2022-06-19 PROCEDURE — 93005 ELECTROCARDIOGRAM TRACING: CPT

## 2022-06-19 PROCEDURE — 258N000003 HC RX IP 258 OP 636: Performed by: PHYSICIAN ASSISTANT

## 2022-06-19 RX ADMIN — SODIUM CHLORIDE 1000 ML: 9 INJECTION, SOLUTION INTRAVENOUS at 14:12

## 2022-06-19 RX ADMIN — SODIUM CHLORIDE 1000 ML: 9 INJECTION, SOLUTION INTRAVENOUS at 16:19

## 2022-06-19 ASSESSMENT — ENCOUNTER SYMPTOMS
FEVER: 0
BLOOD IN STOOL: 0
NUMBNESS: 0
DIZZINESS: 0
DIARRHEA: 0
WEAKNESS: 0
DYSURIA: 0
SORE THROAT: 0
HEADACHES: 0
SHORTNESS OF BREATH: 0
LIGHT-HEADEDNESS: 0
VOMITING: 0
ABDOMINAL PAIN: 0
COUGH: 0

## 2022-06-19 NOTE — ED TRIAGE NOTES
[place] Sunbathing all day. Decreased PO intake today. Stood to leave, fell down. Oriented x 4. Sleepy. Hypotensive 103/69. I liter IVF given. No change is BP. VSS. NSR. . 20 ga Right hand. Sister and friend with her. Denies drug/alcohol use.

## 2022-06-19 NOTE — ED PROVIDER NOTES
History     Chief Complaint:  Heat Exposure     25 y/o female presents with family for evaluation of syncope sustained just PTA.  [place]  Went to go to rinse off area.  Started to feel nauseated.  Then sustained syncopal episode.  Sister saw.  Pt did not bit tongue and did not loose control of bowel or bladder.  Currently feeling improved s/p IV fluids in triage.  Has no complaints at this time.  Had mild HA earlier.     Denies vision change  No N/T/W to extremities  No CP/dyspnea  No recent fevers  Denies recent cough/congestion/ST  No urinary irritative complaints  Denies abd pain or recent N/V/D or bloody stools    Denies chronic health problems  Denies pregnancy  Not breast feeding    Local resident  PCP established  Mom/sister at     FHx: No sudden death or known brain aneurysm, no known clotting disorders, no known structural heart disease or arrhythmias.     The history is provided by the patient and medical records. No  was used.      ROS:  Review of Systems   Constitutional: Negative for fever.   HENT: Negative for congestion and sore throat.    Eyes: Negative for visual disturbance.   Respiratory: Negative for cough and shortness of breath.    Cardiovascular: Negative for chest pain.   Gastrointestinal: Negative for abdominal pain, blood in stool, diarrhea and vomiting.        Nausea prior to syncope    Genitourinary: Negative for dysuria.        No loss of B/B control    Neurological: Positive for syncope. Negative for dizziness, weakness, light-headedness, numbness and headaches.   All other systems reviewed and are negative.    Allergies:  No Known Allergies     Medications:    Calcium-Phosphorus-Vitamin D (CALCIUM GUMMIES PO)  traMADol (ULTRAM) 50 MG tablet      Past Medical History:    Past Medical History:   Diagnosis Date     NO ACTIVE PROBLEMS      Past Surgical History:    Past Surgical History:   Procedure Laterality Date     dental  peter        Family History:    family history is not on file.    Social History:   reports that she has never smoked. She has never used smokeless tobacco. She reports that she does not drink alcohol and does not use drugs.  PCP: [facility]     Physical Exam     Patient Vitals for the past 24 hrs:   BP Temp Temp src Pulse Resp SpO2 Height Weight   06/19/22 1729 119/73 -- -- 62 -- -- -- --   06/19/22 1344 109/75 96.8  F (36  C) Temporal 69 18 100 % 1.524 m (5') 61.2 kg (135 lb)      Physical Exam  Vitals and nursing note reviewed.   Constitutional:       General: She is not in acute distress.     Appearance: Normal appearance. She is not ill-appearing, toxic-appearing or diaphoretic.   HENT:      Head: Normocephalic and atraumatic.      Right Ear: External ear normal.      Left Ear: External ear normal.      Mouth/Throat:      Comments: No intraoral wound   Eyes:      Pupils: Pupils are equal, round, and reactive to light.   Cardiovascular:      Rate and Rhythm: Normal rate and regular rhythm.      Pulses: Normal pulses.      Heart sounds: Normal heart sounds.   Pulmonary:      Effort: Pulmonary effort is normal. No respiratory distress.      Breath sounds: Normal breath sounds.   Abdominal:      Palpations: Abdomen is soft.      Tenderness: There is no abdominal tenderness.   Musculoskeletal:         General: Normal range of motion.      Cervical back: Normal range of motion and neck supple. No rigidity.   Skin:     General: Skin is warm.      Capillary Refill: Capillary refill takes less than 2 seconds.   Neurological:      General: No focal deficit present.      Mental Status: She is alert.   Psychiatric:         Mood and Affect: Mood normal.         Behavior: Behavior normal.         Thought Content: Thought content normal.         Judgment: Judgment normal.       Emergency Department Course   ECG:  ECG results from 06/19/22   EKG 12-lead, tracing only     Value    Systolic Blood Pressure      Diastolic Blood Pressure     Ventricular Rate 68    Atrial Rate 68    MI Interval 138    QRS Duration 82        QTc 429    P Axis 30    R AXIS 69    T Axis 45    Interpretation ECG      Sinus rhythm  Septal infarct , age undetermined  Abnormal ECG  No previous ECGs available  Confirmed by GENERATED REPORT, COMPUTER ([id number]),  ROMAINE MCGILL ([id number]) on 2022 5:42:08 PM       Laboratory:  Labs Ordered and Resulted from Time of ED Arrival to Time of ED Departure   BASIC METABOLIC PANEL - Abnormal       Result Value    Sodium 140      Potassium 4.0      Chloride 113 (*)     Carbon Dioxide (CO2) 23      Anion Gap 4      Urea Nitrogen 9      Creatinine 0.59      Calcium 7.5 (*)     Glucose 106 (*)     GFR Estimate >90     CBC WITH PLATELETS AND DIFFERENTIAL - Abnormal    WBC Count 9.2      RBC Count 5.20      Hemoglobin 11.8      Hematocrit 36.2      MCV 70 (*)     MCH 22.7 (*)     MCHC 32.6      RDW 14.6      Platelet Count 323      % Neutrophils 81      % Lymphocytes 12      % Monocytes 5      % Eosinophils 1      % Basophils 1      % Immature Granulocytes 0      NRBCs per 100 WBC 0      Absolute Neutrophils 7.5      Absolute Lymphocytes 1.1      Absolute Monocytes 0.4      Absolute Eosinophils 0.1      Absolute Basophils 0.1      Absolute Immature Granulocytes 0.0      Absolute NRBCs 0.0     HCG QUALITATIVE PREGNANCY - Normal    hCG Serum Qualitative Negative        Emergency Department Course:       Reviewed:  I reviewed nursing notes, vitals and past medical history    Assessments:  1540: I obtained history and examined the patient as noted above.   1744: Labs/EKG noted.  Re-eval.  Discussed dispo plan.     Interventions:  Medications   0.9% sodium chloride BOLUS (0 mLs Intravenous Stopped 22 1530)   0.9% sodium chloride BOLUS (1,000 mLs Intravenous New Bag 22 1619)      Disposition:  The patient was discharged to home.     Impression & Plan      Medical Decision Makin y/o female presents  with family for evaluation of syncope sustained just PTA.  [place]  Went to go to rinse off area.  Started to feel nauseated.  Then sustained syncopal episode.  Sister saw.  Pt did not bit tongue and did not loose control of bowel or bladder.  Currently feeling improved s/p IV fluids in triage.  Has no complaints at this time.  Had mild HA earlier.     On exam, well appearing and grossly neuro intact.  Vitals noted, reassuring.  Pt feeling improved s/p fluids here.  Discussed suspect heat exposure/vasovagal syncope.  Discussed this is felt less likely seizure.  Not suspected this is PE or worrisome arrhythmia or ICH or infectious etiology.  That said, will check EKG for reassurance and check CBC for leukocytosis/penia, anemia, and abnl platelets.  BMP to assess electrolytes and renal function and will check pregnancy.  Pt and family are happy with plan.     Update: reassuring evaluation here and pt continues to feel well.  Hypocalcemia noted, discussed with pt and she has long standing Hx of this/known.  Not suspected her syncope was seizure related to hypocalcemia and she has no symptoms of hypocalcemia at present and no prolonged QT on EKG.  She is felt stable for discharge but discussed importance of F/U with PCP this week and calcium level recheck.  Discussed suspect heat exposure/vagal episode.  Will continue to rest and push fluids. Mom remains at BS.  Pt and mom educated on S/S that should prompt ED re-eval.  Questions answered. Verbalized understanding. Comfortable with plan and appreciative.     Diagnosis:    ICD-10-CM    1. Syncope, unspecified syncope type  R55       6/19/2022   Natacha Mackey PA-C Medure, Leah M, PA-C  06/19/22 [id number]

## 2022-06-19 NOTE — ED NOTES
Patient is awake, alert and Oriented to person, place, time and situation.    Airway is patent.    Respirations are regular and unlabored.  Patient talking in full sentences.  Patient denies cough or shortness of breath.    Pulses are strong and regular with palpation.  Skin is normal color, warm and dry.   Cap refill is less than 3 seconds.  Patient denies chest pain/pressure.    Patient has abrasion to right knee that is covered with bandage. Cleaned and bandaged in triage.    Patient denies any complaints at present.     Patient denies HA.    Patient mother at bedside.    Continue to monitor.

## 2022-06-19 NOTE — DISCHARGE INSTRUCTIONS
-Reassuring evaluation today.  Suspect your syncopal episode was related to heat exposure and over stimulation of your vagus nerve.   -Your calcium level is low today.  This is not thought to be related to your passing out today but important this is rechecked with your primary care provider this week.

## 2022-06-19 NOTE — ED TRIAGE NOTES
Patient sitting in chair with family and friend at bedside. Awake and alert. Conversing and laughing.    Respirations are regular and unlabored.

## 2022-06-19 NOTE — ED NOTES
Patient ambulated to BR with standby assist. Gait stable. Denies dizziness/lightheadedness. Denies vision changes.

## 2022-06-19 NOTE — ED TRIAGE NOTES
[place] Did not hit head, complaint of R knee     Triage Assessment     Row Name 06/19/22 8623       Triage Assessment (Adult)    Airway WDL WDL       Respiratory WDL    Respiratory WDL WDL       Skin Circulation/Temperature WDL    Skin Circulation/Temperature WDL WDL       Cardiac WDL    Cardiac WDL WDL       Peripheral/Neurovascular WDL    Peripheral Neurovascular WDL WDL       Cognitive/Neuro/Behavioral WDL    Cognitive/Neuro/Behavioral WDL WDL

## 2022-06-19 NOTE — ED NOTES
Patient and parent updated on continued POC and waits. Patient given water to drink. Denies any N/V. Denies additional needs at present.    Patient given warm blanket for comfort.    Continue to monitor.